# Patient Record
Sex: FEMALE | Race: OTHER | Employment: FULL TIME | URBAN - METROPOLITAN AREA
[De-identification: names, ages, dates, MRNs, and addresses within clinical notes are randomized per-mention and may not be internally consistent; named-entity substitution may affect disease eponyms.]

---

## 2023-01-20 ENCOUNTER — HOSPITAL ENCOUNTER (OUTPATIENT)
Age: 33
Discharge: HOME | End: 2023-01-20
Payer: COMMERCIAL

## 2023-01-20 ENCOUNTER — APPOINTMENT (OUTPATIENT)
Dept: GENERAL RADIOLOGY | Age: 33
End: 2023-01-20
Attending: NURSE PRACTITIONER
Payer: COMMERCIAL

## 2023-01-20 VITALS
HEART RATE: 77 BPM | TEMPERATURE: 98 F | DIASTOLIC BLOOD PRESSURE: 75 MMHG | SYSTOLIC BLOOD PRESSURE: 128 MMHG | OXYGEN SATURATION: 100 % | RESPIRATION RATE: 18 BRPM

## 2023-01-20 DIAGNOSIS — S99.929A FOOT INJURY: ICD-10-CM

## 2023-01-20 DIAGNOSIS — S92.415A CLOSED NONDISPLACED FRACTURE OF PROXIMAL PHALANX OF LEFT GREAT TOE, INITIAL ENCOUNTER: Primary | ICD-10-CM

## 2023-01-20 PROCEDURE — E0114 CRUTCH UNDERARM PAIR NO WOOD: HCPCS | Performed by: NURSE PRACTITIONER

## 2023-01-20 PROCEDURE — 29515 APPLICATION SHORT LEG SPLINT: CPT | Performed by: NURSE PRACTITIONER

## 2023-01-20 PROCEDURE — 73630 X-RAY EXAM OF FOOT: CPT | Performed by: NURSE PRACTITIONER

## 2023-01-20 PROCEDURE — 99203 OFFICE O/P NEW LOW 30 MIN: CPT | Performed by: NURSE PRACTITIONER

## 2023-01-20 NOTE — ED INITIAL ASSESSMENT (HPI)
Pt came in due to right foot injury during a fall that occurred 4 days ago while pt was going up the stairs. Pt c/o of right big toe swelling, bruising, and pain. Pt has easy non labored respirations.

## 2024-01-24 ENCOUNTER — HOSPITAL ENCOUNTER (OUTPATIENT)
Age: 34
Discharge: HOME | End: 2024-01-24
Payer: COMMERCIAL

## 2024-01-24 VITALS
TEMPERATURE: 98 F | HEART RATE: 72 BPM | DIASTOLIC BLOOD PRESSURE: 64 MMHG | SYSTOLIC BLOOD PRESSURE: 128 MMHG | OXYGEN SATURATION: 100 % | RESPIRATION RATE: 20 BRPM

## 2024-01-24 DIAGNOSIS — R35.0 URINARY FREQUENCY: ICD-10-CM

## 2024-01-24 DIAGNOSIS — J10.1 INFLUENZA A: Primary | ICD-10-CM

## 2024-01-24 LAB
B-HCG UR QL: NEGATIVE
GLUCOSE UR STRIP-MCNC: NEGATIVE MG/DL
HGB UR QL STRIP: NEGATIVE
LEUKOCYTE ESTERASE UR QL STRIP: NEGATIVE
NITRITE UR QL STRIP: NEGATIVE
PH UR STRIP: 6 [PH]
POCT INFLUENZA A: POSITIVE
POCT INFLUENZA B: NEGATIVE
PROT UR STRIP-MCNC: 30 MG/DL
S PYO AG THROAT QL: NEGATIVE
SARS-COV-2 RNA RESP QL NAA+PROBE: NOT DETECTED
SP GR UR STRIP: >=1.03
UROBILINOGEN UR STRIP-ACNC: <2 MG/DL

## 2024-01-24 PROCEDURE — 99213 OFFICE O/P EST LOW 20 MIN: CPT | Performed by: NURSE PRACTITIONER

## 2024-01-24 PROCEDURE — 81002 URINALYSIS NONAUTO W/O SCOPE: CPT | Performed by: NURSE PRACTITIONER

## 2024-01-24 PROCEDURE — 87880 STREP A ASSAY W/OPTIC: CPT | Performed by: NURSE PRACTITIONER

## 2024-01-24 PROCEDURE — 81025 URINE PREGNANCY TEST: CPT | Performed by: NURSE PRACTITIONER

## 2024-01-24 PROCEDURE — 87502 INFLUENZA DNA AMP PROBE: CPT | Performed by: NURSE PRACTITIONER

## 2024-01-24 PROCEDURE — U0002 COVID-19 LAB TEST NON-CDC: HCPCS | Performed by: NURSE PRACTITIONER

## 2024-01-24 RX ORDER — NITROFURANTOIN 25; 75 MG/1; MG/1
100 CAPSULE ORAL DAILY
COMMUNITY
Start: 2023-11-02

## 2024-01-24 NOTE — ED PROVIDER NOTES
Patient Seen in: [facility]      History     Chief Complaint   Patient presents with    Sore Throat     Possible UTI, sore throat and sweats - Entered by patient    Urinary Symptoms     Stated Complaint: Sore Throat - Possible UTI, sore throat and sweats    Subjective:   34 y/o female with history of pyelonephritis and recurrent UTIs, currently on daily dose of Macrobid, presents with of generalized bodyaches, headache, congestion, nonproductive cough and sore throat onset Sunday [place] from [place].  Symptoms became worse last night and began having chills, sweats and low back achiness.  Had negative home COVID test.  Patient also complains of urinary frequency and urgency since last night.  Patient states had similar symptoms when she was admitted for pyelonephritis.  Has been taking her Macrobid every day for the past 90 days.  No chest pain, shortness of breath, abdominal pain, hematuria, nausea/vomiting.  Has not taken any over-the-counter cold meds            Objective:   History reviewed. No pertinent past medical history.           History reviewed. No pertinent surgical history.             Social History     Socioeconomic History    Marital status:    Tobacco Use    Smoking status: Never   Substance and Sexual Activity    Alcohol use: No    Drug use: No              Review of Systems   Constitutional:  Positive for chills. Negative for fever.   HENT:  Positive for congestion and sore throat.    Respiratory:  Positive for cough.    Gastrointestinal:  Negative for abdominal pain, diarrhea, nausea and vomiting.   Genitourinary:  Negative for dysuria, flank pain, frequency, hematuria and urgency.   Musculoskeletal:  Positive for myalgias.   Neurological:  Positive for headaches.   All other systems reviewed and are negative.      Positive for stated complaint: Sore Throat - Possible UTI, sore throat and sweats  Other systems are as noted in HPI.  Constitutional and  vital signs reviewed.      All other systems reviewed and negative except as noted above.    Physical Exam     ED Triage Vitals [01/24/24 0850]   /64   Pulse 72   Resp 20   Temp 98 °F (36.7 °C)   Temp src Temporal   SpO2 100 %   O2 Device None (Room air)       Current:/64   Pulse 72   Temp 98 °F (36.7 °C) (Temporal)   Resp 20   SpO2 100%         Physical Exam  Vitals and nursing note reviewed.   Constitutional:       General: She is not in acute distress.     Appearance: Normal appearance. She is well-developed. She is not ill-appearing.   HENT:      Head: Normocephalic.      Right Ear: Tympanic membrane and external ear normal.      Left Ear: Tympanic membrane and external ear normal.      Nose: Nose normal.      Mouth/Throat:      Mouth: Mucous membranes are moist.      Pharynx: Oropharynx is clear. Uvula midline.      Tonsils: No tonsillar exudate.   Cardiovascular:      Rate and Rhythm: Normal rate and regular rhythm.   Pulmonary:      Effort: Pulmonary effort is normal.      Breath sounds: Normal breath sounds.   Abdominal:      General: Bowel sounds are normal.      Palpations: Abdomen is soft.      Tenderness: There is no abdominal tenderness. There is no right CVA tenderness or left CVA tenderness.   Musculoskeletal:         General: Normal range of motion.      Cervical back: Normal range of motion and neck supple.   Skin:     General: Skin is warm.      Capillary Refill: Capillary refill takes less than 2 seconds.   Neurological:      Mental Status: She is alert and oriented to person, place, and time.   Psychiatric:         Behavior: Behavior is cooperative.               ED Course     Labs Reviewed   [facility] POCT URINALYSIS DIPSTICK - Abnormal; Notable for the following components:       Result Value    Urine Color Jocelin (*)     Urine Clarity Slightly cloudy (*)     Protein urine 30 (*)     Ketone, Urine Trace (*)     Bilirubin, Urine Small (*)     All other components within normal limits    POCT FLU TEST - Abnormal; Notable for the following components:    POCT INFLUENZA A Positive (*)     All other components within normal limits    Narrative:     This assay is a rapid molecular in vitro test utilizing nucleic acid amplification of influenza A and B viral RNA.   POCT RAPID STREP - Normal   POCT PREGNANCY URINE - Normal   RAPID SARS-COV-2 BY PCR - Normal   URINE CULTURE, ROUTINE                      MDM            Medical Decision Making  Patient is well-appearing.  I discussed differentials with patient including but not limited to viral uri vs viral/strep pharyngitis. UTI vs renal calculi vs pyelonephritis  Rapid Influenza positive for Influenza A. Rapid COVID, strep negative  Urine dip cloudy.  Blood, leukocytes or nitrites present.  Urine culture pending  Patient requesting a switch in her antibiotics.  Discussed with patient that her urine does not show an acute infection and we will send out a urine culture.  Discussed with patient to contact her urologist for possible switching her antibiotics due to her urologist currently managing her recurrent UTIs long-term antibiotics.  Increase water intake, cool mist humidifier, warm salt water gargles, good hand washingotc meds prn  Fu with PCP/Urology. Return/ ED precautions discussed      Problems Addressed:  Influenza A: acute illness or injury  Urinary frequency: acute illness or injury    Amount and/or Complexity of Data Reviewed  Labs: ordered. Decision-making details documented in ED Course.        Disposition and Plan     Clinical Impression:  1. Influenza A    2. Urinary frequency         Disposition:  Discharge  1/24/2024  9:47 am    Follow-up:  Jurgen Gould MD  [address] [address]  [phone]      OR FOLLOW UP WITH YOUR UROLOGIST          Medications Prescribed:  Discharge Medication List as of 1/24/2024  9:52 AM

## 2024-01-24 NOTE — ED INITIAL ASSESSMENT (HPI)
Pt states Sunday began having a cough, sore throat, congestion, HA. And body aches. Pt took covid test Monday and was neg. Pt states symptoms seem to be worse at night. Pt states last night couldn't sleep well and began having the chills and sweats. Pt states having a UTI yeats ago that she was hospitalized for that felt similar symptoms from last night. Pt states having urgency and frequency issues. Pt states has had hx of recurrent UTIs and PCP has her on daily dose of macrobid.

## 2024-12-20 ENCOUNTER — HOSPITAL ENCOUNTER (OUTPATIENT)
Age: 34
Discharge: HOME | End: 2024-12-20
Payer: COMMERCIAL

## 2024-12-20 VITALS
DIASTOLIC BLOOD PRESSURE: 82 MMHG | OXYGEN SATURATION: 100 % | SYSTOLIC BLOOD PRESSURE: 135 MMHG | RESPIRATION RATE: 16 BRPM | WEIGHT: 159 LBS | TEMPERATURE: 98 F | HEIGHT: 65 IN | HEART RATE: 69 BPM | BODY MASS INDEX: 26.49 KG/M2

## 2024-12-20 DIAGNOSIS — N89.8 VAGINAL DISCHARGE: ICD-10-CM

## 2024-12-20 DIAGNOSIS — R30.0 DYSURIA: Primary | ICD-10-CM

## 2024-12-20 LAB
B-HCG UR QL: NEGATIVE
BILIRUB UR QL STRIP: NEGATIVE
BV BACTERIA DNA VAG QL NAA+PROBE: POSITIVE
C GLABRATA DNA VAG QL NAA+PROBE: NEGATIVE
C KRUSEI DNA VAG QL NAA+PROBE: NEGATIVE
CANDIDA DNA VAG QL NAA+PROBE: NEGATIVE
CLARITY UR: CLEAR
COLOR UR: YELLOW
GLUCOSE UR STRIP-MCNC: NEGATIVE MG/DL
HGB UR QL STRIP: NEGATIVE
KETONES UR STRIP-MCNC: NEGATIVE MG/DL
LEUKOCYTE ESTERASE UR QL STRIP: NEGATIVE
NITRITE UR QL STRIP: NEGATIVE
PH UR STRIP: 7 [PH]
PROT UR STRIP-MCNC: NEGATIVE MG/DL
SP GR UR STRIP: 1.02
T VAGINALIS DNA VAG QL NAA+PROBE: NEGATIVE
UROBILINOGEN UR STRIP-ACNC: <2 MG/DL

## 2024-12-20 PROCEDURE — 87086 URINE CULTURE/COLONY COUNT: CPT | Performed by: NURSE PRACTITIONER

## 2024-12-20 PROCEDURE — 81514 NFCT DS BV&VAGINITIS DNA ALG: CPT | Performed by: NURSE PRACTITIONER

## 2024-12-20 RX ORDER — NITROFURANTOIN 25; 75 MG/1; MG/1
100 CAPSULE ORAL 2 TIMES DAILY
Qty: 10 CAPSULE | Refills: 0 | Status: SHIPPED | OUTPATIENT
Start: 2024-12-20 | End: 2024-12-25

## 2024-12-20 RX ORDER — METRONIDAZOLE 500 MG/1
500 TABLET ORAL 2 TIMES DAILY
Qty: 14 TABLET | Refills: 0 | Status: SHIPPED | OUTPATIENT
Start: 2024-12-20 | End: 2024-12-27

## 2024-12-20 NOTE — ED INITIAL ASSESSMENT (HPI)
Pt presents to the IC with c/o urgency, frequency and pain with urination since earlier this week. No fever, n/v. Pt notes lower abd discomfort. Last UTI was approx 4 mo ago. Pt also reports a white vaginal discharge.

## 2024-12-20 NOTE — DISCHARGE INSTRUCTIONS
You are being treated for presumed urine infection and bacterial vaginosis.  Take both antibiotics twice a day until gone.  Do not mix them with alcohol as you will get very sick.  Drink plenty of fluids.  Cultures are pending and results will be available in your MyChart in about 48 hours.  We will call you with any positive results to discuss treatment options.  If no improvement schedule follow-up with your gynecologist

## 2024-12-20 NOTE — ED PROVIDER NOTES
Patient Seen in: [facility]      History     Chief Complaint   Patient presents with    Urinary Symptoms     Entered by patient     Stated Complaint: Urinary Symptoms  Subjective:   34-year-old female with no past medical history presents from home.  Patient is here with concern for UTI.  States urgency, frequency, dysuria.  Symptoms for about 1 week.  Also notes white vaginal discharge with a fishy odor.  Very mild itching to the vaginal area.  No fever.  No vaginal bleeding.  No abdominal or back pain.  Denies risk for STD.  States she did have BV many years ago.    The history is provided by the patient. No  was used.     Objective:   History reviewed. No pertinent past medical history.         History reviewed. No pertinent surgical history.           Social History     Socioeconomic History    Marital status:    Tobacco Use    Smoking status: Never   Substance and Sexual Activity    Alcohol use: No    Drug use: No            Review of Systems    Positive for stated complaint: Urinary Symptoms (Entered by patient)    Other systems are as noted in HPI.  Constitutional and vital signs reviewed.      All other systems reviewed and negative except as noted above.    Physical Exam     ED Triage Vitals [12/20/24 0900]   /82   Pulse 69   Resp 16   Temp 98.1 °F (36.7 °C)   Temp src Oral   SpO2 100 %   O2 Device None (Room air)     Current:/82   Pulse 69   Temp 98.1 °F (36.7 °C) (Oral)   Resp 16   Ht 165.1 cm (5' 5\")   Wt 72.1 kg   SpO2 100%   BMI 26.46 kg/m²     Physical Exam  Vitals and nursing note reviewed.   Constitutional:       General: She is not in acute distress.     Appearance: Normal appearance. She is not ill-appearing or toxic-appearing.   HENT:      Head: Normocephalic and atraumatic.      Nose: Nose normal.      Mouth/Throat:      Mouth: Mucous membranes are moist.      Pharynx: Oropharynx is clear.   Eyes:      Pupils: Pupils are equal, round,  and reactive to light.   Cardiovascular:      Rate and Rhythm: Normal rate and regular rhythm.      Pulses: Normal pulses.   Pulmonary:      Effort: Pulmonary effort is normal. No respiratory distress.      Breath sounds: Normal breath sounds.      Comments: Lungs clear.  No adventitious lung sounds.  No distress.  No hypoxia.  Pulse ox 100% ra. Which is normal    Abdominal:      General: Abdomen is flat.      Palpations: Abdomen is soft.      Tenderness: There is no abdominal tenderness. There is no right CVA tenderness or left CVA tenderness.   Genitourinary:     Comments: Patient declined pelvic exam. Self swab for vaginitis panel  Musculoskeletal:         General: No signs of injury. Normal range of motion.      Cervical back: Normal range of motion and neck supple.   Skin:     General: Skin is warm and dry.      Capillary Refill: Capillary refill takes less than 2 seconds.   Neurological:      General: No focal deficit present.      Mental Status: She is alert and oriented to person, place, and time.      GCS: GCS eye subscore is 4. GCS verbal subscore is 5. GCS motor subscore is 6.   Psychiatric:         Mood and Affect: Mood normal.         Behavior: Behavior normal.         Thought Content: Thought content normal.         Judgment: Judgment normal.         ED Course   Radiology:  No results found.  Labs Reviewed   POCT PREGNANCY URINE - Normal   [facility] POCT URINALYSIS DIPSTICK   URINE CULTURE, ROUTINE   VAGINITIS VAGINOSIS PCR PANEL     Recent Results (from the past 24 hours)   POCT Urinalysis Dipstick    Collection Time: 12/20/24  9:08 AM   Result Value Ref Range    Urine Color Yellow Yellow    Urine Clarity Clear Clear    Specific Gravity, Urine 1.025 1.005 - 1.030    PH, Urine 7.0 5.0 - 8.0    Protein urine Negative Negative mg/dL    Glucose, Urine Negative Negative mg/dL    Ketone, Urine Negative Negative mg/dL    Bilirubin, Urine Negative Negative    Blood, Urine Negative Negative    Nitrite Urine Negative  Negative    Urobilinogen urine <2.0 <2.0 mg/dL    Leukocyte esterase urine Negative Negative   POCT Pregnancy, Urine    Collection Time: 12/20/24  9:08 AM   Result Value Ref Range    POCT Urine Pregnancy Negative Negative       MDM     Medical Decision Making  Differential diagnoses reflecting the complexity of care include: UTI, STD, BV  Patient states she feels like she has a UTI with urgency, frequency, dysuria.   Ua here negative for infection. No leuks or nitrites. Urine was sent for culture. Pregnancy test negative.   Patient denies risk for STD  Discussed possible BV, has had in the past. Patient declined pelvic exam. She self swabbed for vaginitis panel.   Shared decision making with patient.  She would like to be treated for presumed bacterial vaginosis and UTI. Cultures are pending    Plan of Care: Macrobid and Flagyl (she is aware not to mix with alcohol).  Drink plenty of water.  Follow-up with primary doctor or gynecology if no improvement    Results and plan of care discussed with the patient/family. They are in agreement with discharge. They understand to follow up with their primary doctor or the referral physician for further evaluation, especially if no improvement.  Also discussed the limitations of immediate care, patient is aware that if symptoms are worse they should go to the emergency room. Verbal and written discharge instructions were given.     My independent interpretation of studies of: UA without infection  Diagnostic tests and medications considered but not ordered were: Patient declined pelvic exam  Shared decision making was done by: Patient declined STD testing or pelvic exam  Comorbidities that add complexity to management include: None  External chart review was done and was noted: Most recent urine culture in January negative for growth  History obtained by an independent source was from: N/A  Discussions and management was done with: N/A  Social determinants of health that  affect care: N/A              Problems Addressed:  Dysuria: acute illness or injury  Vaginal discharge: acute illness or injury    Amount and/or Complexity of Data Reviewed  Labs: ordered. Decision-making details documented in ED Course.    Risk  Prescription drug management.        Disposition and Plan     Clinical Impression:  1. Dysuria    2. Vaginal discharge         Disposition:  Discharge  12/20/2024  9:23 am    Follow-up:  Radha White, APRN  [address] [address] [phone]                Medications Prescribed:  Discharge Medication List as of 12/20/2024  9:26 AM        START taking these medications    Details   !! nitrofurantoin monohydrate macro 100 MG Oral Cap Take 1 capsule (100 mg total) by mouth 2 (two) times daily for 5 days., Normal, Disp-10 capsule, R-0      metroNIDAZOLE 500 MG Oral Tab Take 1 tablet (500 mg total) by mouth in the morning and 1 tablet (500 mg total) before bedtime. Do all this for 7 days., Normal, Disp-14 tablet, R-0       !! - Potential duplicate medications found. Please discuss with provider.

## (undated) NOTE — LETTER
Date & Time: 1/20/2023, 12:46 PM  Patient: Dani Sevilla  Encounter Provider(s):    COLBY Pozo       To Whom It May Concern:    Dani Sevilla was seen and treated in our department on 1/20/2023. She can return to work with these limitations: light duty until reevaluated by orthopedic surgeon. If you have any questions or concerns, please do not hesitate to call.       SUJEY Antonio  Physician/APC Signature

## (undated) NOTE — LETTER
Date & Time: 1/24/2024, 9:46 AM  Patient: Jonelle Franco  Encounter Provider(s):    Vicki Burgess APRN       To Whom It May Concern:    Jonelle Franco was seen and treated in our department on 1/24/2024. She should not return to work until 01/29/2024 .    If you have any questions or concerns, please do not hesitate to call.        _____________________________  Physician/APC Signature